# Patient Record
Sex: MALE
[De-identification: names, ages, dates, MRNs, and addresses within clinical notes are randomized per-mention and may not be internally consistent; named-entity substitution may affect disease eponyms.]

---

## 2018-07-24 PROBLEM — Z00.00 ENCOUNTER FOR PREVENTIVE HEALTH EXAMINATION: Status: ACTIVE | Noted: 2018-07-24

## 2024-02-27 ENCOUNTER — APPOINTMENT (OUTPATIENT)
Dept: ORTHOPEDIC SURGERY | Facility: CLINIC | Age: 59
End: 2024-02-27
Payer: COMMERCIAL

## 2024-02-27 VITALS — BODY MASS INDEX: 29.8 KG/M2 | HEIGHT: 72 IN | WEIGHT: 220 LBS

## 2024-02-27 DIAGNOSIS — M65.331 TRIGGER FINGER, RIGHT MIDDLE FINGER: ICD-10-CM

## 2024-02-27 DIAGNOSIS — F41.9 ANXIETY DISORDER, UNSPECIFIED: ICD-10-CM

## 2024-02-27 DIAGNOSIS — M18.11 UNILATERAL PRIMARY OSTEOARTHRITIS OF FIRST CARPOMETACARPAL JOINT, RIGHT HAND: ICD-10-CM

## 2024-02-27 DIAGNOSIS — Z87.19 PERSONAL HISTORY OF OTHER DISEASES OF THE DIGESTIVE SYSTEM: ICD-10-CM

## 2024-02-27 DIAGNOSIS — F32.A ANXIETY DISORDER, UNSPECIFIED: ICD-10-CM

## 2024-02-27 PROCEDURE — 73110 X-RAY EXAM OF WRIST: CPT | Mod: RT

## 2024-02-27 PROCEDURE — 99203 OFFICE O/P NEW LOW 30 MIN: CPT | Mod: 25

## 2024-02-27 PROCEDURE — 20605 DRAIN/INJ JOINT/BURSA W/O US: CPT | Mod: RT

## 2024-02-27 PROCEDURE — 20550 NJX 1 TENDON SHEATH/LIGAMENT: CPT | Mod: 59,RT

## 2024-02-27 RX ORDER — BUPROPION HYDROCHLORIDE 75 MG/1
TABLET, FILM COATED ORAL
Refills: 0 | Status: ACTIVE | COMMUNITY

## 2024-02-27 RX ORDER — OMEPRAZOLE 20 MG/1
TABLET, ORALLY DISINTEGRATING, DELAYED RELEASE ORAL
Refills: 0 | Status: ACTIVE | COMMUNITY

## 2024-02-27 RX ORDER — HYDROXYZINE HYDROCHLORIDE 50 MG/1
TABLET ORAL
Refills: 0 | Status: ACTIVE | COMMUNITY

## 2024-02-27 RX ORDER — ESCITALOPRAM OXALATE 20 MG/1
TABLET ORAL
Refills: 0 | Status: ACTIVE | COMMUNITY

## 2024-02-27 NOTE — DISCUSSION/SUMMARY
[de-identified] : Discussed the nature of the diagnosis and risk and benefits of different modalities of treatment. Rt wrist x-rays reviewed and discussed. RT CMC and RT middle TF.  Discussed conservative management vs CSI.  Pt would like a CSI for both CMC and RT middle TF.  Done today and tolerated well. All questions answered.

## 2024-02-27 NOTE — PHYSICAL EXAM
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

## 2024-02-27 NOTE — HISTORY OF PRESENT ILLNESS
[Localized] : localized [Dull/Aching] : dull/aching [Shooting] : shooting [Sharp] : sharp [de-identified] : 59 year old male presenting with RT hand pain, primarily at the base of the thumb. No injury/trauma. Unable to open jars and turn keys. Also with stiffness of the RT middle and ring fingers.  [] : no [FreeTextEntry5] : RT hand pain. Received prior treatment last April from dr. Day. Had Sx and CSI inj. LROM [FreeTextEntry1] : RT hand

## 2024-02-27 NOTE — PROCEDURE
[Medium Joint Injection] : medium joint injection [CMC Joint] : CMC joint [Right] : of the right [Tendon Sheath] : tendon sheath [3rd] : 3rd trigger finger [Pain] : pain [Inflammation] : inflammation [Alcohol] : alcohol [Ethyl Chloride sprayed topically] : ethyl chloride sprayed topically [Sterile technique used] : sterile technique used [___ cc    1%] : Lidocaine ~Vcc of 1%  [___ cc    10mg] : Triamcinolone (Kenalog) ~Vcc of 10 mg  [] : Patient tolerated procedure well [Risks, benefits, alternatives discussed / Verbal consent obtained] : the risks benefits, and alternatives have been discussed, and verbal consent was obtained

## 2024-05-28 ENCOUNTER — APPOINTMENT (OUTPATIENT)
Dept: ORTHOPEDIC SURGERY | Facility: CLINIC | Age: 59
End: 2024-05-28

## 2024-12-09 ENCOUNTER — APPOINTMENT (OUTPATIENT)
Dept: ORTHOPEDIC SURGERY | Facility: CLINIC | Age: 59
End: 2024-12-09
Payer: COMMERCIAL

## 2024-12-09 DIAGNOSIS — M18.11 UNILATERAL PRIMARY OSTEOARTHRITIS OF FIRST CARPOMETACARPAL JOINT, RIGHT HAND: ICD-10-CM

## 2024-12-09 DIAGNOSIS — M65.332 TRIGGER FINGER, LEFT MIDDLE FINGER: ICD-10-CM

## 2024-12-09 DIAGNOSIS — M65.331 TRIGGER FINGER, RIGHT MIDDLE FINGER: ICD-10-CM

## 2024-12-09 PROCEDURE — 99214 OFFICE O/P EST MOD 30 MIN: CPT | Mod: 25

## 2024-12-09 PROCEDURE — 73130 X-RAY EXAM OF HAND: CPT | Mod: 50

## 2024-12-09 PROCEDURE — 20550 NJX 1 TENDON SHEATH/LIGAMENT: CPT | Mod: 50

## 2025-03-03 ENCOUNTER — APPOINTMENT (OUTPATIENT)
Dept: ORTHOPEDIC SURGERY | Facility: CLINIC | Age: 60
End: 2025-03-03
Payer: MEDICAID

## 2025-03-03 DIAGNOSIS — M65.332 TRIGGER FINGER, LEFT MIDDLE FINGER: ICD-10-CM

## 2025-03-03 DIAGNOSIS — M65.312 TRIGGER THUMB, LEFT THUMB: ICD-10-CM

## 2025-03-03 DIAGNOSIS — M65.331 TRIGGER FINGER, RIGHT MIDDLE FINGER: ICD-10-CM

## 2025-03-03 DIAGNOSIS — M18.11 UNILATERAL PRIMARY OSTEOARTHRITIS OF FIRST CARPOMETACARPAL JOINT, RIGHT HAND: ICD-10-CM

## 2025-03-03 PROCEDURE — 99214 OFFICE O/P EST MOD 30 MIN: CPT

## 2025-03-03 RX ORDER — ATORVASTATIN CALCIUM 40 MG/1
40 TABLET, FILM COATED ORAL
Refills: 0 | Status: ACTIVE | COMMUNITY

## 2025-03-28 ENCOUNTER — OUTPATIENT (OUTPATIENT)
Dept: OUTPATIENT SERVICES | Facility: HOSPITAL | Age: 60
LOS: 1 days | End: 2025-03-28
Payer: MEDICAID

## 2025-03-28 VITALS
DIASTOLIC BLOOD PRESSURE: 80 MMHG | OXYGEN SATURATION: 97 % | TEMPERATURE: 98 F | RESPIRATION RATE: 18 BRPM | HEART RATE: 79 BPM | WEIGHT: 253.09 LBS | SYSTOLIC BLOOD PRESSURE: 127 MMHG | HEIGHT: 72 IN

## 2025-03-28 DIAGNOSIS — Z98.890 OTHER SPECIFIED POSTPROCEDURAL STATES: Chronic | ICD-10-CM

## 2025-03-28 DIAGNOSIS — M65.30 TRIGGER FINGER, UNSPECIFIED FINGER: ICD-10-CM

## 2025-03-28 DIAGNOSIS — M65.312 TRIGGER THUMB, LEFT THUMB: ICD-10-CM

## 2025-03-28 LAB
ANION GAP SERPL CALC-SCNC: 13 MMOL/L — SIGNIFICANT CHANGE UP (ref 5–17)
BUN SERPL-MCNC: 22 MG/DL — SIGNIFICANT CHANGE UP (ref 7–23)
CALCIUM SERPL-MCNC: 9.8 MG/DL — SIGNIFICANT CHANGE UP (ref 8.4–10.5)
CHLORIDE SERPL-SCNC: 105 MMOL/L — SIGNIFICANT CHANGE UP (ref 96–108)
CO2 SERPL-SCNC: 23 MMOL/L — SIGNIFICANT CHANGE UP (ref 22–31)
CREAT SERPL-MCNC: 1.47 MG/DL — HIGH (ref 0.5–1.3)
EGFR: 55 ML/MIN/1.73M2 — LOW
EGFR: 55 ML/MIN/1.73M2 — LOW
GLUCOSE SERPL-MCNC: 89 MG/DL — SIGNIFICANT CHANGE UP (ref 70–99)
HCT VFR BLD CALC: 46.7 % — SIGNIFICANT CHANGE UP (ref 39–50)
HGB BLD-MCNC: 16.6 G/DL — SIGNIFICANT CHANGE UP (ref 13–17)
MCHC RBC-ENTMCNC: 31.2 PG — SIGNIFICANT CHANGE UP (ref 27–34)
MCHC RBC-ENTMCNC: 35.5 G/DL — SIGNIFICANT CHANGE UP (ref 32–36)
MCV RBC AUTO: 87.8 FL — SIGNIFICANT CHANGE UP (ref 80–100)
NRBC BLD AUTO-RTO: 0 /100 WBCS — SIGNIFICANT CHANGE UP (ref 0–0)
PLATELET # BLD AUTO: 267 K/UL — SIGNIFICANT CHANGE UP (ref 150–400)
POTASSIUM SERPL-MCNC: 4 MMOL/L — SIGNIFICANT CHANGE UP (ref 3.5–5.3)
POTASSIUM SERPL-SCNC: 4 MMOL/L — SIGNIFICANT CHANGE UP (ref 3.5–5.3)
RBC # BLD: 5.32 M/UL — SIGNIFICANT CHANGE UP (ref 4.2–5.8)
RBC # FLD: 13.8 % — SIGNIFICANT CHANGE UP (ref 10.3–14.5)
SODIUM SERPL-SCNC: 141 MMOL/L — SIGNIFICANT CHANGE UP (ref 135–145)
WBC # BLD: 8.67 K/UL — SIGNIFICANT CHANGE UP (ref 3.8–10.5)
WBC # FLD AUTO: 8.67 K/UL — SIGNIFICANT CHANGE UP (ref 3.8–10.5)

## 2025-03-28 PROCEDURE — 80048 BASIC METABOLIC PNL TOTAL CA: CPT

## 2025-03-28 PROCEDURE — G0463: CPT

## 2025-03-28 PROCEDURE — 85027 COMPLETE CBC AUTOMATED: CPT

## 2025-03-28 NOTE — H&P PST ADULT - NSICDXFAMILYHX_GEN_ALL_CORE_FT
FAMILY HISTORY:  Father  Still living? Unknown  FH: pancreatic cancer, Age at diagnosis: Age Unknown    Mother  Still living? Unknown  FH: dementia, Age at diagnosis: Age Unknown    Sibling  Still living? Unknown  Family history of cardiac disorder, Age at diagnosis: Age Unknown

## 2025-03-28 NOTE — H&P PST ADULT - OTHER CARE PROVIDERS
Ameya Stone (cardiac)- [phone]- last visit- 12/2024 , neuro - Nupur Timmons- [phone]- 12/2024

## 2025-03-28 NOTE — H&P PST ADULT - NSANTHOSAYNRD_GEN_A_CORE
Derek Carlson- [phone]- did study 3/2025 awaiting results/No. RICH screening performed.  STOP BANG Legend: 0-2 = LOW Risk; 3-4 = INTERMEDIATE Risk; 5-8 = HIGH Risk

## 2025-03-28 NOTE — H&P PST ADULT - PROBLEM SELECTOR PLAN 1
Scheduled for left thumb and middle finger trigger finger releases on 4/18/25 with Dr. Linda Anderson.   Preop instructions provided.  Questions answered.

## 2025-03-28 NOTE — H&P PST ADULT - HISTORY OF PRESENT ILLNESS
60 year old male presents to PST for scheduled left thumb and middle finger trigger finger releases on 4/18/25 with Dr. Linda Anderson. PMH anxiety/depression, GERD<, HLD, Left meniscus repair, right RCR, left carpal tunnel release (2022 with Dr. Day) . Denies N/V, SOB, AUGUST, chest pain or palpitations.  60 year old male presents to PST for scheduled left thumb and middle finger trigger finger releases on 4/18/25 with Dr. Linda Anderson. PMH anxiety/depression, GERD, HLD, Left meniscus repair, right RCR, left carpal tunnel release (2022 with Dr. Day). Was evaluated after syncope episodes back in November 2024, suffered broken rib and cardiac workup, holter monitor, with no significant findings- follows PCP now.  No syncopal episodes since. Denies N/V, SOB, AUGUST, chest pain or palpitations.

## 2025-03-28 NOTE — H&P PST ADULT - NSICDXPASTSURGICALHX_GEN_ALL_CORE_FT
PAST SURGICAL HISTORY:  arthroscopy of shoulder right 1988    History of carpal tunnel release     S/P rotator cuff repair

## 2025-03-28 NOTE — H&P PST ADULT - NSICDXPASTMEDICALHX_GEN_ALL_CORE_FT
PAST MEDICAL HISTORY:  Anxiety and depression     GERD (gastroesophageal reflux disease)     HLD (hyperlipidemia)     Medial meniscus tear     Syncope

## 2025-03-28 NOTE — H&P PST ADULT - ASSESSMENT
DASI score: 8   DASI activity: able to perform ADL, stairs or ambulates and goes to to gym for cardiac and weights 4-5 x week without SOB or AUGUST   Loose teeth or denture: denies

## 2025-04-18 ENCOUNTER — TRANSCRIPTION ENCOUNTER (OUTPATIENT)
Age: 60
End: 2025-04-18

## 2025-04-18 ENCOUNTER — APPOINTMENT (OUTPATIENT)
Dept: ORTHOPEDIC SURGERY | Facility: HOSPITAL | Age: 60
End: 2025-04-18
Payer: MEDICAID

## 2025-04-18 ENCOUNTER — OUTPATIENT (OUTPATIENT)
Dept: OUTPATIENT SERVICES | Facility: HOSPITAL | Age: 60
LOS: 1 days | End: 2025-04-18
Payer: MEDICAID

## 2025-04-18 VITALS
OXYGEN SATURATION: 98 % | HEART RATE: 62 BPM | DIASTOLIC BLOOD PRESSURE: 75 MMHG | SYSTOLIC BLOOD PRESSURE: 121 MMHG | RESPIRATION RATE: 14 BRPM

## 2025-04-18 VITALS
RESPIRATION RATE: 17 BRPM | HEART RATE: 73 BPM | DIASTOLIC BLOOD PRESSURE: 85 MMHG | WEIGHT: 253.09 LBS | HEIGHT: 72 IN | TEMPERATURE: 97 F | SYSTOLIC BLOOD PRESSURE: 130 MMHG | OXYGEN SATURATION: 97 %

## 2025-04-18 DIAGNOSIS — Z98.890 OTHER SPECIFIED POSTPROCEDURAL STATES: Chronic | ICD-10-CM

## 2025-04-18 DIAGNOSIS — M65.312 TRIGGER THUMB, LEFT THUMB: ICD-10-CM

## 2025-04-18 PROCEDURE — 26055 INCISE FINGER TENDON SHEATH: CPT | Mod: 59,F2

## 2025-04-18 PROCEDURE — 26055 INCISE FINGER TENDON SHEATH: CPT | Mod: F2

## 2025-04-18 RX ORDER — FENTANYL CITRATE-0.9 % NACL/PF 100MCG/2ML
25 SYRINGE (ML) INTRAVENOUS
Refills: 0 | Status: DISCONTINUED | OUTPATIENT
Start: 2025-04-18 | End: 2025-04-18

## 2025-04-18 RX ORDER — ONDANSETRON HCL/PF 4 MG/2 ML
4 VIAL (ML) INJECTION ONCE
Refills: 0 | Status: ACTIVE | OUTPATIENT
Start: 2025-04-18 | End: 2026-03-17

## 2025-04-18 RX ORDER — LIDOCAINE HCL/PF 10 MG/ML
0.2 VIAL (ML) INJECTION ONCE
Refills: 0 | Status: COMPLETED | OUTPATIENT
Start: 2025-04-18 | End: 2025-04-18

## 2025-04-18 RX ORDER — HYDROXYZINE HYDROCHLORIDE 25 MG/1
1 TABLET, FILM COATED ORAL
Refills: 0 | DISCHARGE

## 2025-04-18 RX ORDER — OMEPRAZOLE 20 MG/1
1 CAPSULE, DELAYED RELEASE ORAL
Refills: 0 | DISCHARGE

## 2025-04-18 RX ORDER — BUPROPION HYDROBROMIDE 522 MG/1
1 TABLET, EXTENDED RELEASE ORAL
Refills: 0 | DISCHARGE

## 2025-04-18 RX ORDER — SODIUM CHLORIDE 9 G/1000ML
1000 INJECTION, SOLUTION INTRAVENOUS
Refills: 0 | Status: ACTIVE | OUTPATIENT
Start: 2025-04-18 | End: 2026-03-17

## 2025-04-18 RX ORDER — ATORVASTATIN CALCIUM 80 MG/1
1 TABLET, FILM COATED ORAL
Refills: 0 | DISCHARGE

## 2025-04-18 RX ADMIN — SODIUM CHLORIDE 100 MILLILITER(S): 9 INJECTION, SOLUTION INTRAVENOUS at 11:44

## 2025-04-18 RX ADMIN — Medication 3 MILLILITER(S): at 11:28

## 2025-04-18 RX ADMIN — Medication 1 APPLICATION(S): at 11:45

## 2025-04-18 NOTE — ASU PATIENT PROFILE, ADULT - IS PATIENT PREGNANT?
not applicable (Male) normal (ped)... In no apparent distress, appears well developed and well nourished.

## 2025-04-18 NOTE — ASU DISCHARGE PLAN (ADULT/PEDIATRIC) - FINANCIAL ASSISTANCE
[facility] provides services at a reduced cost to those who are determined to be eligible through [facility]’s financial assistance program. Information regarding [facility]’s financial assistance program can be found by going to [email or web address] or by calling [phone].

## 2025-04-18 NOTE — ASU DISCHARGE PLAN (ADULT/PEDIATRIC) - CARE PROVIDER_API CALL
Linda Anderson  Orthopaedic Surgery  [address], [address] [place] [id number]  Phone: [phone]  Fax: [phone]  Follow Up Time:

## 2025-04-18 NOTE — ASU DISCHARGE PLAN (ADULT/PEDIATRIC) - NURSING INSTRUCTIONS
Next dose of Tylenol will be on or after 08:30 pm, tonight, If needed for pain. Your first dose of Tylenol was given at 02:30 pm. Do not exceed more than 4000mg of Tylenol in one 24 hour setting.

## 2025-04-18 NOTE — BRIEF OPERATIVE NOTE - NSICDXBRIEFPROCEDURE_GEN_ALL_CORE_FT
PROCEDURES:  Release of left trigger thumb 18-Apr-2025 15:42:16  Linda Anderson  Release of trigger finger of left middle finger 18-Apr-2025 15:42:21  Linda Anderson

## 2025-04-18 NOTE — PRE-ANESTHESIA EVALUATION ADULT - NSANTHPMHFT_GEN_ALL_CORE
60 year old male PMH anxiety/depression, GERD, HLD, Left meniscus repair, right RCR, left carpal tunnel release (2022 with Dr. Day). Was evaluated after syncope episodes back in November 2024, suffered broken rib and cardiac workup, holter monitor, with no significant findings

## 2025-04-18 NOTE — PRE-ANESTHESIA EVALUATION ADULT - NSANTHOSAYNRD_GEN_A_CORE
Derek Carlson- [phone]- did study 3/2025 awaiting results/No. RICH screening performed.  STOP BANG Legend: 0-2 = LOW Risk; 3-4 = INTERMEDIATE Risk; 5-8 = HIGH Risk

## 2025-04-18 NOTE — ASU DISCHARGE PLAN (ADULT/PEDIATRIC) - NS MD DC FALL RISK RISK
For information on Fall & Injury Prevention, visit: [email or web address] OR  [email or web address] OR  https://www.cdc.gov/steadi/patient.html

## 2025-04-18 NOTE — BRIEF OPERATIVE NOTE - NSICDXBRIEFPOSTOP_GEN_ALL_CORE_FT
POST-OP DIAGNOSIS:  Trigger thumb, left thumb 18-Apr-2025 15:42:41  Linda Anderson  Trigger finger, left middle finger 18-Apr-2025 15:42:46  Linda Anderson

## 2025-04-18 NOTE — PRE-ANESTHESIA EVALUATION ADULT - NSANTHOBSERVEDRD_ENT_A_CORE
DISCHARGE SUMMARY      Tova Morales  MRN:  [id number]  YOB: 1978      Time spent was greater than 30  minutes in discharge day activities, including the final exam of the patient, medication management, and instructions for continuing care.    Today's Date:  1/22/2024  Admission date:  01/09/2024      Reason for Admission:  Depression and anxiety     History of Presenting Illness:  The patient is a  45 year old female who presents to the Partial Hospitalization Program on 1/9/2024 for step-down care after inpatient hospitalization at [facility] 12/28/23-1/5/24 after a suicide attempt on 12/25/2023.  Patient reports history of depression and anxiety that have been worsening secondary to work stress.  Patient is followed by outpatient prescriber Miguel Ángel Gomez D.N.P., therapist Su Salcedo with [facility], and PCP Dar Correa MD with [facility].       Prior to Admit Medications:  Doxepin (Sinequan) 10 mg capsules; take 1 capsule nightly as needed for sleep.  Patient is not taking as it is ineffective.  Fluoxetine (Prozac) 20 mg capsule daily.  Was prescribed weekly Prozac 90 mg, but did not start taking.  Quetiapine (Seroquel) 50 mg tablet; take 1 tablet daily for mood management    Treatment Course:       Medication Changes:  Patient had recently been started on fluoxetine (Prozac) 20 mg capsule daily, so did not increase while in PHP.  Discussed symptoms that were indicative of attention deficit disorder, but patient had never been formally diagnosed.  She had tried Wellbutrin in the past, which elevated her heart rate, but decided to trial clonidine 0.1 mg twice daily.  Patient noted that clonidine helped with anxiety during the day and sleep at night, which made her feel more organized throughout the day.  Patient was continued on quetiapine (Seroquel) 50 mg nightly.  Patient  reports sleeping at least 6 hours which she states is “good for me”.  On day of discharge, patient opted to go back to work full-time; may call in the future to attend [facility] if interested.    Patient was encouraged to engage in individual and group based therapeutic activities.  Patient was taught skills to manage emotions appropriately and coping skills to deal with anxiety and depression.  Patient was educated in ways to identify triggers and use coping skills.       Patient was engaged in age appropriate behavioral therapy, recreational and art therapy.  Medical consult was obtained.     Patient reported that engaging in groups has been helpful and was able to verbalize coping skills. Reviewed discharge plans with patient. Patient understands the rationale for medications, discharge plans.  Provided safety education, strategies to deal with crisis and after care plans.  Safety plan was reviewed with patient.  Consulted and coordinated with treatment team including nursing staff,  and therapist regarding discharge planning.  Patient agreed with treatment plan.      Discharge Medications:        Summary of your Discharge Medications        Take these Medications        Details   cloNIDine 0.1 MG tablet  Commonly known as: CATAPRES   Take 1 tablet by mouth in the morning and 1 tablet in the evening.  Comment: Revise to 90-day supply     FLUoxetine 20 MG capsule  Commonly known as: PROzac   Take 1 capsule by mouth daily.     QUEtiapine 50 MG tablet  Commonly known as: SEROquel   Take 1 tablet by mouth daily.     Vitamin D (Ergocalciferol) 1.25 mg (50,000 units) capsule   Take 1 capsule by mouth 1 day a week. Indications: Vitamin D Deficiency  Comment: 1.25 mg = 50,000 units.  Adding refills for duration of 12 weeks total.              Patient is on two or more scheduled antipsychotic agents:  No       Mental Status Exam:  Mental Status Exam  Alert and oriented x 3,cooperative, good eye contact.   Casually  dressed, fair grooming.  Speech is normal rate, rhythm, and volume.  Gait/Station:  Normal.  Motor:  No agitation or abnormal movements.   Euthymic mood and affect.  Thought processes are goal directed and logical.  Thought content negative for suicidal/homicidal ideation, negative for auditory/visual hallucinations, negative for paranoia.  Insight:  good, as evidenced by patient's insight into her own illness  Judgment:  good, as evidenced by engagement in treatment  Memory grossly intact for long, short. immediate memory.  IQ estimated to be average from vocabulary and syntax, but not formally tested.     Condition of Patient on Discharge (Problem Based or Mental Status):       Stable mood.        Risk Status:  Compa for safety             Post Hospitalization Plan:        Home     CONDITION OF PATIENT ON DISCHARGE:  Patient was seen and evaluated on the day of discharge. Mood, anxiety, appetite and sleep improved significantly prior to discharge. Thought content was negative for suicidal ideation, homicidal ideation, audiovisual hallucinations. Insight and judgment were improved. Patient had reasonable discharge plan and was motivated. Therefore, patient was sufficiently stabilized to step down to lower level of care. Patient was able to care for basic needs. Patient was NOT at imminent risk of suicide or homicide and denied having access to firearms.  Patient was able to voice that they would call 911 or go to the nearest emergency room if any suicidal ideation, homicidal ideation, manic, or psychotic symptoms were to occur, or if they felt unsafe for any reason.      DISCHARGE INSTRUCTIONS INCLUDE: Discharge medications, followup appointments, safety plan, and importance of adherence to treatment were reviewed with patient who understood and agreed with discharge plans. Sobriety from alcohol, illicit drugs or tobacco was encouraged for optimal treatment outcome. Writer emphasized to the patient that her  medical condition requires continuing physician supervision, and it is important that patient keeps scheduled appointment with providers as noted on After Visit Summary.  Patient was informed that we will provide her with an adequate amount of medication for 2 months, to allow time for establishment of outpatient care, but after that time we will not continue to provide care or refills. Patient informed that we reserve the right to deny any refill request as patient is no longer in PHP and no longer under my care once discharged.      Patient was advised  that if she misses scheduled outpatient appointment and has an emergent psychiatric need, she should obtain assessment at the nearest emergency department. Patient was given the opportunity to ask questions. There were no educational barriers to learning and all questions were answered. Patient verbalized understanding of treatment contract and acceptance of potential risks and consequences should she miss followup appointment.          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    Disposition:  Home.    After Care:    Instructions  Medications  Miguel Ángel Gomez  [address]  [phone]  2/13/24  3:30pm this is a virtual appointment. This will be a video appointment through [employer] on [facility] web site.  You will receive either a text or email with instructions to set up.  If needed, call [phone] for assistance to set-up.        This patient is referred to the [facility] PHP, IOP, RTC, Bharat, OP Program, OP Program or [facility] OP Provider. Providers in these programs have access to the EMR.         Therapy  Su Salcedo  [address]  [address]  [facility]  [phone]  Pt to schedule           If you are  interested in returning to attend IOP call Central Scheduling at [phone] to schedule an assessment.     If you have a video visit, see the instructions below.      Before your video visit  1. Be sure to complete PreCheck-In up to three days before your       appointment.  2. If you haven't already, download the Zoom web conferencing          tool. If you need help with this, go to:      http://aa.org/help         Note: You do not need to download Zoom if your video visit is      taking place through the [employer] leander.     At the time of your video visit  1. Click Begin Video Visit a few minutes before your appointment        time. Zoom will open.   2. Stay by your device and keep Zoom open until your provider       joins the video visit.  3. In the unlikely event that your provider hasn't joined 15 minutes       after your appointment start time, you can leave the video visit       and call their office to reschedule.   4. During your visit, your provider may ask about your symptoms,     vital signs or updates from your last appointment.     Need more information?  Get all your questions answered at our Help Center:  http://aa.org/help     Attention: Driving while participating in a video visit is potentially   dangerous and therefore not permitted. If your plans have changed   and you need to be driving during the time of your visit, please   reschedule your appointment.         MARIUSZ Fleming    1/22/2024         No

## 2025-04-18 NOTE — ASU DISCHARGE PLAN (ADULT/PEDIATRIC) - ASU DC SPECIAL INSTRUCTIONSFT
see attached DO NOT EXPOSED HAND TO HEAT OR ICE UNTIL NUMBING MEDICINE WEARS OFF.  The numbing medicine should wear off later today or tomorrow.  Once numbing medicine wears off, ok to apply ice (wrapped in a towel) to surgical site for 15 minutes, 3 times a day as needed for pain.  Take over the counter medicine - Tylenol 1,000mg up to every 8 hours, Ibuprofen 600mg up to every 8 hours - as needed for pain.   Limit Tylenol use if you have a history of liver problems.   Limit Ibuprofen use if you have a history of stomach ulcer, GI bleed, kidney disease, or are on a blood thinner.    Keep the dressing clean and dry, cover with a plastic bag secured with a rubber band at the opening for showers. It is ok to loosen the bandage if it is too tight.  On Tuesday, remove the dressing, and then it is ok to wash the hand normally at the sink and shower, but do not soak/scrub the surgical site in a tub, pool, or ocean for 4 weeks after surgery.  Do not apply any lotion or ointment to the surgical incisions for 2 weeks after surgery.    Elevate the hand above the level of the heart as much as possible to reduce swelling.  Make a tight fist with the hand, 10 times a set, at least 5 sets a day.   Do not lift more than 5lbs with the hand for the first 4 weeks.    A post-operative appointment has been made for you for next week.  Please call my surgical coordinator, Trudy [phone] ext [phone] if you need to reschedule.  Please call the office [phone] if there are any further questions or concerns. DO NOT EXPOSED HAND TO HEAT OR ICE UNTIL NUMBING MEDICINE WEARS OFF.    ********************************************************************************************   The numbing medicine should wear off later today or tomorrow.    ********************************************************************************************   Once numbing medicine wears off, ok to apply ice (wrapped in a towel) to surgical site for 15 minutes, 3 times a day as needed for pain.    ********************************************************************************************   Take over the counter medicine - Tylenol 1,000mg up to every 8 hours, Ibuprofen 600mg up to every 8 hours - as needed for pain.   ******************************************************************************************   Limit Tylenol use if you have a history of liver problems.   Limit Ibuprofen use if you have a history of stomach ulcer, GI bleed, kidney disease, or are on a blood thinner.  ******************************************************************************************   Keep the dressing clean and dry, cover with a plastic bag secured with a rubber band at the opening for showers. It is ok to loosen the bandage if it is too tight.  ******************************************************************************************   On Tuesday, remove the dressing, and then it is ok to wash the hand normally at the sink and shower, but do not soak/scrub the surgical site in a tub, pool, or ocean for 4 weeks after surgery.  Do not apply any lotion or ointment to the surgical incisions for 2 weeks after surgery.  ******************************************************************************************   Elevate the hand above the level of the heart as much as possible to reduce swelling.  Make a tight fist with the hand, 10 times a set, at least 5 sets a day.   Do not lift more than 5lbs with the hand for the first 4 weeks.  ******************************************************************************************   A post-operative appointment has been made for you for next week.  Please call my surgical coordinator, Trudy [phone] ext [phone] if you need to reschedule.  Please call the office [phone] if there are any further questions or concerns.

## 2025-04-24 ENCOUNTER — APPOINTMENT (OUTPATIENT)
Dept: ORTHOPEDIC SURGERY | Facility: CLINIC | Age: 60
End: 2025-04-24
Payer: MEDICAID

## 2025-04-24 DIAGNOSIS — M65.331 TRIGGER FINGER, RIGHT MIDDLE FINGER: ICD-10-CM

## 2025-04-24 DIAGNOSIS — M65.312 TRIGGER THUMB, LEFT THUMB: ICD-10-CM

## 2025-04-24 DIAGNOSIS — M65.332 TRIGGER FINGER, LEFT MIDDLE FINGER: ICD-10-CM

## 2025-04-24 DIAGNOSIS — M18.11 UNILATERAL PRIMARY OSTEOARTHRITIS OF FIRST CARPOMETACARPAL JOINT, RIGHT HAND: ICD-10-CM

## 2025-04-24 PROBLEM — R55 SYNCOPE AND COLLAPSE: Chronic | Status: ACTIVE | Noted: 2025-03-28

## 2025-04-24 PROBLEM — E78.5 HYPERLIPIDEMIA, UNSPECIFIED: Chronic | Status: ACTIVE | Noted: 2025-03-28

## 2025-04-24 PROBLEM — K21.9 GASTRO-ESOPHAGEAL REFLUX DISEASE WITHOUT ESOPHAGITIS: Chronic | Status: ACTIVE | Noted: 2025-03-28

## 2025-04-24 PROBLEM — F41.9 ANXIETY DISORDER, UNSPECIFIED: Chronic | Status: ACTIVE | Noted: 2025-03-28

## 2025-04-24 PROCEDURE — 99213 OFFICE O/P EST LOW 20 MIN: CPT | Mod: 24

## 2025-04-24 PROCEDURE — 99024 POSTOP FOLLOW-UP VISIT: CPT

## 2025-05-16 ENCOUNTER — OUTPATIENT (OUTPATIENT)
Dept: OUTPATIENT SERVICES | Facility: HOSPITAL | Age: 60
LOS: 1 days | End: 2025-05-16
Payer: MEDICAID

## 2025-05-16 VITALS
HEART RATE: 71 BPM | OXYGEN SATURATION: 97 % | TEMPERATURE: 98 F | HEIGHT: 72 IN | WEIGHT: 246.04 LBS | SYSTOLIC BLOOD PRESSURE: 134 MMHG | DIASTOLIC BLOOD PRESSURE: 85 MMHG

## 2025-05-16 DIAGNOSIS — M65.30 TRIGGER FINGER, UNSPECIFIED FINGER: ICD-10-CM

## 2025-05-16 DIAGNOSIS — Z98.890 OTHER SPECIFIED POSTPROCEDURAL STATES: Chronic | ICD-10-CM

## 2025-05-16 DIAGNOSIS — M65.30 TRIGGER FINGER, UNSPECIFIED FINGER: Chronic | ICD-10-CM

## 2025-05-16 DIAGNOSIS — G47.33 OBSTRUCTIVE SLEEP APNEA (ADULT) (PEDIATRIC): ICD-10-CM

## 2025-05-16 DIAGNOSIS — M65.331 TRIGGER FINGER, RIGHT MIDDLE FINGER: ICD-10-CM

## 2025-05-16 DIAGNOSIS — Z01.818 ENCOUNTER FOR OTHER PREPROCEDURAL EXAMINATION: ICD-10-CM

## 2025-05-16 PROCEDURE — G0463: CPT

## 2025-05-16 NOTE — H&P PST ADULT - HISTORY OF PRESENT ILLNESS
60 year old male presents to PST for scheduled Right Middle Finger Trigger Finger Release with Dr. Linda Anderson on 6/6/25.  PMH obesity (BMI 33.4), RICH on Cpap (newly diagnosed in April 2025), anxiety/depression, GERD, HLD, Left meniscus repair, right RCR, left carpal tunnel release (2022 with Dr. Day) s/p left thumb and trigger finger release (4/18/2025 with Dr. Anderson). Pt . was evaluated after syncope episodes back in November 2024, suffered broken rib and cardiac workup, holter monitor, with no significant findings- follows PCP now.  No syncopal episodes since. Denies N/V, SOB, AUGUST, chest pain or palpitations.

## 2025-05-16 NOTE — H&P PST ADULT - NSICDXPASTSURGICALHX_GEN_ALL_CORE_FT
PAST SURGICAL HISTORY:  arthroscopy of shoulder right 1988    H/O medial meniscus repair of right knee     History of carpal tunnel release     Left trigger finger     S/P rotator cuff repair

## 2025-05-16 NOTE — H&P PST ADULT - PROBLEM SELECTOR PLAN 1
Pt. scheduled Right Middle Finger Trigger Finger Release with Dr. Anderson on 6/6/25  Pre-op instructions given, all questions answered.  Surgical Soap given.  Labs: CBC, BMP (in sunrise from 3/28/25)

## 2025-05-16 NOTE — H&P PST ADULT - NSALCOHOLTYPE_GEN__A_CORE_SD
Patient called and advised of Dr Ortiz's recommendations. Patient has an appt with Dr Ortiz on 11/21/17.     beer

## 2025-05-22 ENCOUNTER — APPOINTMENT (OUTPATIENT)
Dept: ORTHOPEDIC SURGERY | Facility: CLINIC | Age: 60
End: 2025-05-22
Payer: MEDICAID

## 2025-05-22 DIAGNOSIS — M65.331 TRIGGER FINGER, RIGHT MIDDLE FINGER: ICD-10-CM

## 2025-05-22 DIAGNOSIS — M65.312 TRIGGER THUMB, LEFT THUMB: ICD-10-CM

## 2025-05-22 DIAGNOSIS — M18.11 UNILATERAL PRIMARY OSTEOARTHRITIS OF FIRST CARPOMETACARPAL JOINT, RIGHT HAND: ICD-10-CM

## 2025-05-22 PROCEDURE — 99024 POSTOP FOLLOW-UP VISIT: CPT

## 2025-06-06 ENCOUNTER — APPOINTMENT (OUTPATIENT)
Dept: ORTHOPEDIC SURGERY | Facility: HOSPITAL | Age: 60
End: 2025-06-06

## 2025-06-06 ENCOUNTER — TRANSCRIPTION ENCOUNTER (OUTPATIENT)
Age: 60
End: 2025-06-06

## 2025-06-06 ENCOUNTER — OUTPATIENT (OUTPATIENT)
Dept: OUTPATIENT SERVICES | Facility: HOSPITAL | Age: 60
LOS: 1 days | End: 2025-06-06
Payer: MEDICAID

## 2025-06-06 VITALS
SYSTOLIC BLOOD PRESSURE: 101 MMHG | OXYGEN SATURATION: 93 % | DIASTOLIC BLOOD PRESSURE: 65 MMHG | HEART RATE: 73 BPM | RESPIRATION RATE: 16 BRPM

## 2025-06-06 VITALS
TEMPERATURE: 98 F | OXYGEN SATURATION: 95 % | DIASTOLIC BLOOD PRESSURE: 86 MMHG | HEIGHT: 72 IN | WEIGHT: 246.04 LBS | SYSTOLIC BLOOD PRESSURE: 130 MMHG | HEART RATE: 71 BPM | RESPIRATION RATE: 16 BRPM

## 2025-06-06 DIAGNOSIS — Z98.890 OTHER SPECIFIED POSTPROCEDURAL STATES: Chronic | ICD-10-CM

## 2025-06-06 DIAGNOSIS — M65.331 TRIGGER FINGER, RIGHT MIDDLE FINGER: ICD-10-CM

## 2025-06-06 DIAGNOSIS — M65.30 TRIGGER FINGER, UNSPECIFIED FINGER: Chronic | ICD-10-CM

## 2025-06-06 PROCEDURE — 26055 INCISE FINGER TENDON SHEATH: CPT | Mod: F7

## 2025-06-06 PROCEDURE — 20600 DRAIN/INJ JOINT/BURSA W/O US: CPT | Mod: F5

## 2025-06-06 PROCEDURE — 26055 INCISE FINGER TENDON SHEATH: CPT | Mod: 79,F7

## 2025-06-06 PROCEDURE — 20605 DRAIN/INJ JOINT/BURSA W/O US: CPT | Mod: 79,RT

## 2025-06-06 RX ORDER — SODIUM CHLORIDE 9 G/1000ML
1000 INJECTION, SOLUTION INTRAVENOUS
Refills: 0 | Status: ACTIVE | OUTPATIENT
Start: 2025-06-06 | End: 2026-05-05

## 2025-06-06 RX ORDER — LIDOCAINE HCL/PF 10 MG/ML
0.2 VIAL (ML) INJECTION ONCE
Refills: 0 | Status: COMPLETED | OUTPATIENT
Start: 2025-06-06 | End: 2025-06-06

## 2025-06-06 RX ORDER — ONDANSETRON HCL/PF 4 MG/2 ML
4 VIAL (ML) INJECTION ONCE
Refills: 0 | Status: ACTIVE | OUTPATIENT
Start: 2025-06-06 | End: 2026-05-05

## 2025-06-06 RX ORDER — FENTANYL CITRATE-0.9 % NACL/PF 100MCG/2ML
25 SYRINGE (ML) INTRAVENOUS
Refills: 0 | Status: DISCONTINUED | OUTPATIENT
Start: 2025-06-06 | End: 2025-06-06

## 2025-06-06 RX ADMIN — SODIUM CHLORIDE 100 MILLILITER(S): 9 INJECTION, SOLUTION INTRAVENOUS at 10:30

## 2025-06-06 RX ADMIN — Medication 1 APPLICATION(S): at 10:30

## 2025-06-06 NOTE — ASU DISCHARGE PLAN (ADULT/PEDIATRIC) - ASU DC SPECIAL INSTRUCTIONSFT
DO NOT EXPOSED HAND TO HEAT OR ICE UNTIL NUMBING MEDICINE WEARS OFF.  The numbing medicine should wear off later today or tomorrow.  Once numbing medicine wears off, ok to apply ice (wrapped in a towel) to surgical site for 15 minutes, 3 times a day as needed for pain.  Take over the counter medicine - Tylenol 1,000mg up to every 8 hours, Ibuprofen 600mg up to every 8 hours - as needed for pain.   Limit Tylenol use if you have a history of liver problems.   Limit Ibuprofen use if you have a history of stomach ulcer, GI bleed, kidney disease, or are on a blood thinner.    Keep the dressing clean and dry, cover with a plastic bag secured with a rubber band at the opening for showers. It is ok to loosen the bandage if it is too tight.  On Tuesday, remove the dressing, and then it is ok to wash the hand normally at the sink and shower, but do not soak/scrub the surgical site in a tub, pool, or ocean for 4 weeks after surgery.  Do not apply any lotion or ointment to the surgical incisions for 2 weeks after surgery.    Elevate the hand above the level of the heart as much as possible to reduce swelling.  Make a tight fist with the hand, 10 times a set, at least 5 sets a day.   Do not lift more than 5lbs with the hand for the first 4 weeks.    A post-operative appointment has been made for you for next week.  Please call my surgical coordinator, Trudy [phone] ext [phone] if you need to reschedule.  Please call the office [phone] if there are any further questions or concerns. DO NOT EXPOSED HAND TO HEAT OR ICE UNTIL NUMBING MEDICINE WEARS OFF. The numbing medicine should wear off later today or tomorrow.  ******************************************************************************************   Once numbing medicine wears off, ok to apply ice (wrapped in a towel) to surgical site for 15 minutes, 3 times a day as needed for pain.  ******************************************************************************************   Take over the counter medicine - Tylenol 1,000mg up to every 8 hours. Limit Tylenol use if you have a history of liver problems.   ******************************************************************************************   Keep the dressing clean and dry, cover with a plastic bag secured with a rubber band at the opening for showers. It is ok to loosen the bandage if it is too tight.   ******************************************************************************************   On Tuesday, remove the dressing, and then it is ok to wash the hand normally at the sink and shower, but do not soak/scrub the surgical site in a tub, pool, or ocean for 4 weeks after surgery. Do not apply any lotion or ointment to the surgical incisions for 2 weeks after surgery.  ******************************************************************************************   Elevate the hand above the level of the heart as much as possible to reduce swelling. Make a tight fist with the hand, 10 times a set, at least 5 sets a day.  Do not lift more than 5lbs with the hand for the first 4 weeks.  ******************************************************************************************   A post-operative appointment has been made for you for next week. Please call my surgical coordinator, Trudy [phone] ext [phone] if you need to reschedule. Please call the office [phone] if there are any further questions or concerns.

## 2025-06-06 NOTE — ASU PATIENT PROFILE, ADULT - VISION (WITH CORRECTIVE LENSES IF THE PATIENT USUALLY WEARS THEM):
No new orders at this time.
Insulin infusion stopped.
Redraw BMP.
Normal vision: sees adequately in most situations; can see medication labels, newsprint

## 2025-06-06 NOTE — ASU DISCHARGE PLAN (ADULT/PEDIATRIC) - CALL YOUR DOCTOR IF YOU HAVE ANY OF THE FOLLOWING:
Bleeding that does not stop/Swelling that gets worse/Pain not relieved by Medications/Fever greater than (need to indicate Fahrenheit or Celsius)/Wound/Surgical Site with redness, or foul smelling discharge or pus/Numbness, tingling, color or temperature change to extremity/Nausea and vomiting that does not stop Propranolol Pregnancy And Lactation Text: This medication is Pregnancy Category C and it isn't known if it is safe during pregnancy. It is excreted in breast milk.

## 2025-06-06 NOTE — PRE-ANESTHESIA EVALUATION ADULT - NSANTHPMHFT_GEN_ALL_CORE
60 year old male presents for scheduled Right Middle Finger Trigger Finger Release.  PMH obesity (BMI 33.4), RICH on Cpap (newly diagnosed in April 2025), anxiety/depression, GERD, HLD, Left meniscus repair, right RCR, left carpal tunnel release (2022 with Dr. Day) s/p left thumb and trigger finger release (4/18/2025 with Dr. Anderson). Pt . was evaluated after syncope episodes back in November 2024, suffered broken rib and cardiac workup, holter monitor, with no significant findings- follows PCP now.  No syncopal episodes since.

## 2025-06-06 NOTE — ASU DISCHARGE PLAN (ADULT/PEDIATRIC) - NURSING INSTRUCTIONS
OK to take Tylenol/Acetaminophen at 6PM TONIGHT (last dose @  12PM   in operating room)  for pain and every 6 hours after as needed. OK to take Motrin/Ibuprofen at  6PM TONIGHT (last dose @  12PM   in operating room) for pain and every 6 hours after as needed.

## 2025-06-06 NOTE — BRIEF OPERATIVE NOTE - NSICDXBRIEFPROCEDURE_GEN_ALL_CORE_FT
PROCEDURES:  Release of trigger finger of right middle finger 06-Jun-2025 12:28:09  Linda Anderson  Injection of carpometacarpal (CMC) joint of right thumb 06-Jun-2025 12:28:17  Linda Anderson

## 2025-06-06 NOTE — ASU DISCHARGE PLAN (ADULT/PEDIATRIC) - FINANCIAL ASSISTANCE
[facility] provides services at a reduced cost to those who are determined to be eligible through [facility]’s financial assistance program. Information regarding [facility]’s financial assistance program can be found by going to [email or web address] or by calling [phone].

## 2025-06-06 NOTE — BRIEF OPERATIVE NOTE - NSICDXBRIEFPOSTOP_GEN_ALL_CORE_FT
POST-OP DIAGNOSIS:  Trigger finger, right middle finger 06-Jun-2025 12:28:48  Linda Anderson  Arthritis of carpometacarpal (CMC) joint of right thumb 06-Jun-2025 12:28:55  Linda Anderson

## 2025-06-06 NOTE — BRIEF OPERATIVE NOTE - NSICDXBRIEFPREOP_GEN_ALL_CORE_FT
PRE-OP DIAGNOSIS:  Trigger finger, right middle finger 06-Jun-2025 12:28:33  Linda Anderson  Arthritis of carpometacarpal (CMC) joint of right thumb 06-Jun-2025 12:28:39  Linda Anderson

## 2025-06-11 ENCOUNTER — APPOINTMENT (OUTPATIENT)
Dept: ORTHOPEDIC SURGERY | Facility: CLINIC | Age: 60
End: 2025-06-11
Payer: MEDICAID

## 2025-06-11 PROBLEM — G47.33 OBSTRUCTIVE SLEEP APNEA (ADULT) (PEDIATRIC): Chronic | Status: ACTIVE | Noted: 2025-05-16

## 2025-06-11 PROCEDURE — 99024 POSTOP FOLLOW-UP VISIT: CPT

## 2025-07-10 ENCOUNTER — APPOINTMENT (OUTPATIENT)
Dept: ORTHOPEDIC SURGERY | Facility: CLINIC | Age: 60
End: 2025-07-10
Payer: MEDICAID

## 2025-07-10 PROCEDURE — 99024 POSTOP FOLLOW-UP VISIT: CPT

## 2025-07-14 ENCOUNTER — APPOINTMENT (OUTPATIENT)
Dept: INTERNAL MEDICINE | Facility: CLINIC | Age: 60
End: 2025-07-14

## 2025-07-14 ENCOUNTER — OUTPATIENT (OUTPATIENT)
Dept: OUTPATIENT SERVICES | Facility: HOSPITAL | Age: 60
LOS: 1 days | End: 2025-07-14

## 2025-07-14 DIAGNOSIS — Z98.890 OTHER SPECIFIED POSTPROCEDURAL STATES: Chronic | ICD-10-CM

## 2025-07-14 DIAGNOSIS — M65.30 TRIGGER FINGER, UNSPECIFIED FINGER: Chronic | ICD-10-CM

## 2025-07-18 PROBLEM — E66.9 OBESITY (BMI 30-39.9): Status: ACTIVE | Noted: 2025-07-18

## 2025-07-23 ENCOUNTER — APPOINTMENT (OUTPATIENT)
Dept: SURGERY | Facility: CLINIC | Age: 60
End: 2025-07-23
Payer: MEDICAID

## 2025-07-23 VITALS — WEIGHT: 242 LBS | HEIGHT: 72 IN | BODY MASS INDEX: 32.78 KG/M2

## 2025-07-23 DIAGNOSIS — E66.9 OBESITY, UNSPECIFIED: ICD-10-CM

## 2025-07-23 PROCEDURE — 99204 OFFICE O/P NEW MOD 45 MIN: CPT | Mod: 95

## 2025-07-24 RX ORDER — PHENTERMINE HYDROCHLORIDE 37.5 MG/1
37.5 TABLET ORAL
Qty: 30 | Refills: 3 | Status: ACTIVE | COMMUNITY
Start: 2025-07-23

## (undated) DEVICE — TOURNIQUET CUFF 24" DUAL PORT DUAL BLADDER W PLC (BLACK)

## (undated) DEVICE — GLV 6 PROTEXIS (WHITE)

## (undated) DEVICE — GLV 6.5 PROTEXIS (BLUE)

## (undated) DEVICE — DRAPE 3/4 SHEET W REINFORCEMENT 56X77"

## (undated) DEVICE — TOURNIQUET ESMARK 4"

## (undated) DEVICE — DRSG ACE BANDAGE 2"

## (undated) DEVICE — DRSG ADAPTIC 3 X 3"

## (undated) DEVICE — SUT MONOCRYL 4-0 18" P-3 UNDYED

## (undated) DEVICE — DRAPE TOWEL BLUE 17" X 24"

## (undated) DEVICE — DRSG STERISTRIPS 0.5 X 4"

## (undated) DEVICE — TOURNIQUET CUFF 18" DUAL PORT DUAL BLADDER W PLC (BLACK)

## (undated) DEVICE — DRAPE SPLIT SHEET 77" X 108"

## (undated) DEVICE — PREP CHLORAPREP HI-LITE ORANGE 26ML

## (undated) DEVICE — PACK HAND

## (undated) DEVICE — SUT MONOCRYL 4-0 27" PS-2 UNDYED